# Patient Record
Sex: MALE | Race: BLACK OR AFRICAN AMERICAN | Employment: OTHER | URBAN - METROPOLITAN AREA
[De-identification: names, ages, dates, MRNs, and addresses within clinical notes are randomized per-mention and may not be internally consistent; named-entity substitution may affect disease eponyms.]

---

## 2018-03-08 ENCOUNTER — OFFICE VISIT (OUTPATIENT)
Dept: FAMILY MEDICINE CLINIC | Age: 63
End: 2018-03-08

## 2018-03-08 VITALS
BODY MASS INDEX: 25.84 KG/M2 | RESPIRATION RATE: 20 BRPM | TEMPERATURE: 98.6 F | WEIGHT: 195 LBS | HEART RATE: 62 BPM | OXYGEN SATURATION: 100 % | DIASTOLIC BLOOD PRESSURE: 88 MMHG | SYSTOLIC BLOOD PRESSURE: 171 MMHG | HEIGHT: 73 IN

## 2018-03-08 DIAGNOSIS — I10 ESSENTIAL HYPERTENSION: Primary | ICD-10-CM

## 2018-03-08 DIAGNOSIS — Z12.11 SCREENING FOR COLON CANCER: ICD-10-CM

## 2018-03-08 DIAGNOSIS — F17.200 SMOKER: ICD-10-CM

## 2018-03-08 RX ORDER — VARENICLINE TARTRATE 1 MG/1
TABLET, FILM COATED ORAL
Qty: 56 TAB | Refills: 0 | Status: SHIPPED | COMMUNITY
Start: 2018-03-08 | End: 2018-06-06

## 2018-03-08 RX ORDER — AMLODIPINE BESYLATE 10 MG/1
10 TABLET ORAL DAILY
Qty: 30 TAB | Refills: 2 | Status: SHIPPED | OUTPATIENT
Start: 2018-03-08

## 2018-03-08 NOTE — PROGRESS NOTES
No chief complaint on file. 1. When and where did you last receive medical care? [facility] 3/5/2018- Work physical    2. When and where did you last have preventive care such as mammogram, pap smears or colon screening? No history of colon screening    3. What is your current living situation (for example, live alone, live in home with immediate family members)? Lives a ex-wife    4. Do you have any problems with communication such trouble seeing, hearing, or understanding instructions? No    5. Do you have an advance directive? This is a document that you can give to family members with instructions for how you would want them to make health care decisions for you if you were unable to speak for yourself. (For example, unconscious, delerious)No    PMH/FH/Social Hx reviewed and updated as needed     Applicable screenings reviewed and updated as needed  Medication reconciliation performed. Patient does need medication refills. Health Maintenance reviewed.

## 2018-03-08 NOTE — PROGRESS NOTES
KIKE Larsoe is a 65 y.o. male being seen today for   Chief Complaint   Patient presents with    New Patient     [facility] healthcare services    Hypertension     Monitor and emdciate HTN   . he states that he was sent by kamilah made after job physical showed his bp was too high. Previously on amlodipine but not in awhile. States he is going to get insurance once he has job. Would like to quit smoking. Past Medical History:   Diagnosis Date    Hypertension 10/2008         ROS  Patient states that he is feeling well. Denies complaints of chest pain, shortness of breath, swelling of legs, dizziness or weakness. he denies nausea, vomiting or diarrhea. Current Outpatient Prescriptions   Medication Sig    amLODIPine (NORVASC) 10 mg tablet Take 1 Tab by mouth daily.  varenicline (CHANTIX) 1 mg tablet Take as directed     No current facility-administered medications for this visit. PE  Visit Vitals    /88 (BP 1 Location: Left arm, BP Patient Position: Sitting)    Pulse 62    Temp 98.6 °F (37 °C) (Oral)    Resp 20    Ht 6' 1\" (1.854 m)    Wt 195 lb (88.5 kg)    SpO2 100%    BMI 25.73 kg/m2        Alert and oriented with normal mood and affect. he is well developed and well nourished . Lungs are clear without wheezing. Heart rate is regular without murmurs or gallops. There is no lower extremity edema. No results found for this or any previous visit. Assessment and Plan:        ICD-10-CM ICD-9-CM    1. Essential hypertension I10 401.9    2. Smoker F17.200 305.1    3. Screening for colon cancer Z12.11 V76.51 OCCULT BLOOD, IMMUNOASSAY (FIT)   restart amlodipine  Follow up 2 mos here or with new pcp if he has insurance for bp check  Sample chantix.   Reviewed how to use and quit plan  Pap process initated for same    Carmen Woodall MD

## 2018-03-08 NOTE — PROGRESS NOTES
Discharge instructions reviewed with patient    Medication list and understanding of medications reviewed with patient. OTC and herbal medications reviewed and added to med list if applicable  Barriers to adherence assessed. Guidance given regarding new medications this visit, including reason for taking this medicine, and common side effects. Given sample Chantix and You have been given a Pharmacy Assistance Program application, also known as P. A. P. application. Please complete all pages and return your P. A. P. application with your financial documentation to any of our [address] [place] [address] sites. If you can not return to one of upcoming sites, you may either mail or fax it to Leo Carroll, the coordinator, yourself.  Her Fax number is [phone]. Her address is [address] [place], [place] [address]. IT IS BEST TO BRING IT BACK TO US TO HAVE IT CHECKED BEFORE SENDING. Once your application is received, it may take anywhere from 3 to 6 weeks for it to be approved.  THE FIRST FILL OF YOUR MEDICINE [facility]. IN RARE CASES IT WILL BE MAILED TO YOUR HOME. REMEMBER: CALL [phone] OR [phone]    TO ASK FOR REFILLS WHEN YOU HAVE ONE MONTH    OF MEDICINE LEFT.  Think of it the same way as when you call your regular pharmacy to order your medicine refills. For continuing Chantix. Also given Good Rx Card and  FIT Test collection instructions and kit.

## 2018-06-12 ENCOUNTER — HOSPITAL ENCOUNTER (EMERGENCY)
Age: 63
Discharge: HOME | End: 2018-06-13
Attending: EMERGENCY MEDICINE
Payer: SELF-PAY

## 2018-06-12 ENCOUNTER — APPOINTMENT (OUTPATIENT)
Dept: CT IMAGING | Age: 63
End: 2018-06-12
Attending: EMERGENCY MEDICINE
Payer: SELF-PAY

## 2018-06-12 ENCOUNTER — APPOINTMENT (OUTPATIENT)
Dept: GENERAL RADIOLOGY | Age: 63
End: 2018-06-12
Attending: EMERGENCY MEDICINE
Payer: SELF-PAY

## 2018-06-12 DIAGNOSIS — R10.84 ABDOMINAL PAIN, GENERALIZED: ICD-10-CM

## 2018-06-12 DIAGNOSIS — R45.851 SUICIDAL THOUGHTS: Primary | ICD-10-CM

## 2018-06-12 LAB
ALBUMIN SERPL-MCNC: 4.1 G/DL (ref 3.4–5)
ALBUMIN/GLOB SERPL: 0.7 {RATIO} (ref 0.8–1.7)
ALP SERPL-CCNC: 115 U/L (ref 45–117)
ALT SERPL-CCNC: 84 U/L (ref 16–61)
AMPHET UR QL SCN: NEGATIVE
ANION GAP SERPL CALC-SCNC: 10 MMOL/L (ref 3–18)
AST SERPL-CCNC: 106 U/L (ref 15–37)
ATRIAL RATE: 81 BPM
BARBITURATES UR QL SCN: NEGATIVE
BASOPHILS # BLD: 0 K/UL (ref 0–0.1)
BASOPHILS NFR BLD: 0 % (ref 0–2)
BENZODIAZ UR QL: NEGATIVE
BILIRUB SERPL-MCNC: 1.2 MG/DL (ref 0.2–1)
BNP SERPL-MCNC: 405 PG/ML (ref 0–900)
BUN SERPL-MCNC: 7 MG/DL (ref 7–18)
BUN/CREAT SERPL: 6 (ref 12–20)
CALCIUM SERPL-MCNC: 9.1 MG/DL (ref 8.5–10.1)
CALCULATED P AXIS, ECG09: 80 DEGREES
CALCULATED R AXIS, ECG10: 22 DEGREES
CALCULATED T AXIS, ECG11: 66 DEGREES
CANNABINOIDS UR QL SCN: NEGATIVE
CHLORIDE SERPL-SCNC: 100 MMOL/L (ref 100–108)
CO2 SERPL-SCNC: 29 MMOL/L (ref 21–32)
COCAINE UR QL SCN: NEGATIVE
CREAT SERPL-MCNC: 1.1 MG/DL (ref 0.6–1.3)
DIAGNOSIS, 93000: NORMAL
DIFFERENTIAL METHOD BLD: ABNORMAL
EOSINOPHIL # BLD: 0.1 K/UL (ref 0–0.4)
EOSINOPHIL NFR BLD: 1 % (ref 0–5)
ERYTHROCYTE [DISTWIDTH] IN BLOOD BY AUTOMATED COUNT: 13.9 % (ref 11.6–14.5)
ETHANOL SERPL-MCNC: 139 MG/DL (ref 0–3)
GLOBULIN SER CALC-MCNC: 5.8 G/DL (ref 2–4)
GLUCOSE SERPL-MCNC: 70 MG/DL (ref 74–99)
HCT VFR BLD AUTO: 45.9 % (ref 36–48)
HDSCOM,HDSCOM: NORMAL
HGB BLD-MCNC: 15.8 G/DL (ref 13–16)
LYMPHOCYTES # BLD: 1.4 K/UL (ref 0.9–3.6)
LYMPHOCYTES NFR BLD: 23 % (ref 21–52)
MCH RBC QN AUTO: 31.7 PG (ref 24–34)
MCHC RBC AUTO-ENTMCNC: 34.4 G/DL (ref 31–37)
MCV RBC AUTO: 92 FL (ref 74–97)
METHADONE UR QL: NEGATIVE
MONOCYTES # BLD: 0.7 K/UL (ref 0.05–1.2)
MONOCYTES NFR BLD: 11 % (ref 3–10)
NEUTS SEG # BLD: 4 K/UL (ref 1.8–8)
NEUTS SEG NFR BLD: 65 % (ref 40–73)
OPIATES UR QL: NEGATIVE
P-R INTERVAL, ECG05: 180 MS
PCP UR QL: NEGATIVE
PLATELET # BLD AUTO: 187 K/UL (ref 135–420)
PMV BLD AUTO: 10.7 FL (ref 9.2–11.8)
POTASSIUM SERPL-SCNC: 4 MMOL/L (ref 3.5–5.5)
PROT SERPL-MCNC: 9.9 G/DL (ref 6.4–8.2)
Q-T INTERVAL, ECG07: 400 MS
QRS DURATION, ECG06: 78 MS
QTC CALCULATION (BEZET), ECG08: 464 MS
RBC # BLD AUTO: 4.99 M/UL (ref 4.7–5.5)
SODIUM SERPL-SCNC: 139 MMOL/L (ref 136–145)
TROPONIN I SERPL-MCNC: <0.02 NG/ML (ref 0–0.04)
TROPONIN I SERPL-MCNC: <0.02 NG/ML (ref 0–0.04)
VENTRICULAR RATE, ECG03: 81 BPM
WBC # BLD AUTO: 6.2 K/UL (ref 4.6–13.2)

## 2018-06-12 PROCEDURE — 71046 X-RAY EXAM CHEST 2 VIEWS: CPT

## 2018-06-12 PROCEDURE — 84484 ASSAY OF TROPONIN QUANT: CPT | Performed by: EMERGENCY MEDICINE

## 2018-06-12 PROCEDURE — 80053 COMPREHEN METABOLIC PANEL: CPT | Performed by: EMERGENCY MEDICINE

## 2018-06-12 PROCEDURE — 85025 COMPLETE CBC W/AUTO DIFF WBC: CPT | Performed by: EMERGENCY MEDICINE

## 2018-06-12 PROCEDURE — 93005 ELECTROCARDIOGRAM TRACING: CPT

## 2018-06-12 PROCEDURE — 83880 ASSAY OF NATRIURETIC PEPTIDE: CPT | Performed by: EMERGENCY MEDICINE

## 2018-06-12 PROCEDURE — 80307 DRUG TEST PRSMV CHEM ANLYZR: CPT | Performed by: EMERGENCY MEDICINE

## 2018-06-12 PROCEDURE — 74011000258 HC RX REV CODE- 258: Performed by: EMERGENCY MEDICINE

## 2018-06-12 PROCEDURE — 70450 CT HEAD/BRAIN W/O DYE: CPT

## 2018-06-12 PROCEDURE — 96374 THER/PROPH/DIAG INJ IV PUSH: CPT

## 2018-06-12 PROCEDURE — 74011000250 HC RX REV CODE- 250: Performed by: EMERGENCY MEDICINE

## 2018-06-12 PROCEDURE — 74011250637 HC RX REV CODE- 250/637: Performed by: EMERGENCY MEDICINE

## 2018-06-12 PROCEDURE — 99285 EMERGENCY DEPT VISIT HI MDM: CPT

## 2018-06-12 RX ORDER — AMLODIPINE BESYLATE 10 MG/1
10 TABLET ORAL
Status: COMPLETED | OUTPATIENT
Start: 2018-06-12 | End: 2018-06-12

## 2018-06-12 RX ADMIN — SODIUM CHLORIDE 0.62 MG: 900 INJECTION, SOLUTION INTRAVENOUS at 21:02

## 2018-06-12 RX ADMIN — AMLODIPINE BESYLATE 10 MG: 10 TABLET ORAL at 23:46

## 2018-06-12 NOTE — ED NOTES
Received patient in room 9. Patient appears in no distress,. Awaiting completion of testing to be resulted then crisis evaluation. Reviewed poc with patient. Questions encouraged and answered.   Patient verbalized an understanding

## 2018-06-12 NOTE — ED PROVIDER NOTES
EMERGENCY DEPARTMENT HISTORY AND PHYSICAL EXAM    3:39 PM      Date: 6/12/2018  Patient Name: Sharon Chung    History of Presenting Illness     Chief Complaint   Patient presents with    Abdominal Pain         History Provided By: Patient    Chief Complaint: Elevated BP  Duration: 2-3 Weeks  Timing:  Constant  Severity: Moderate  Modifying Factors: Ran out of his Amlodipine 1 month ago  Associated Symptoms: dizziness, CP, SOB      Additional History (Context):     Sharon Chung is a 65 y.o. male with a pertinent history of HTN, presenting to the ED via EMS c/o elevated BP x 2-3 weeks. Also reports dizziness, CP, SOB. Pt denies HA. Reports he ran out of the Rx'ed Amlodipine 1 month ago. He has not been able to get the that Rx refilled due to financial restraints. Admits to EtOH use (\"a 6-pack a day\"). States he is currently unemployed. No other acute symptoms or complaints were noted. PCP: None    Current Facility-Administered Medications   Medication Dose Route Frequency Provider Last Rate Last Dose    enalaprilat (VASOTEC) 0.625 mg in 0.9% sodium chloride 50 mL IVPB  0.625 mg IntraVENous Q6H Isidro Waddell MD   0.625 mg at 06/12/18 2102     Current Outpatient Prescriptions   Medication Sig Dispense Refill    amLODIPine (NORVASC) 10 mg tablet Take 1 Tab by mouth daily. 30 Tab 2       Past History     Past Medical History:  Past Medical History:   Diagnosis Date    Hypertension 10/2008       Past Surgical History:  History reviewed. No pertinent surgical history. Family History:  Family History   Problem Relation Age of Onset    Hypertension Mother     Hypertension Father        Social History:  Social History   Substance Use Topics    Smoking status: Former Smoker     Quit date: 3/7/2018    Smokeless tobacco: Never Used    Alcohol use 8.4 oz/week     14 Cans of beer per week       Allergies:  No Known Allergies      Review of Systems       Review of Systems   Constitutional: Negative for fever. Eyes: Positive for visual disturbance. Respiratory: Positive for shortness of breath. Cardiovascular: Positive for chest pain. Skin: Negative for rash. Neurological: Positive for dizziness. Negative for headaches. All other systems reviewed and are negative. Physical Exam     Visit Vitals    BP (!) 189/96    Pulse 82    Resp 22    SpO2 96%         Physical Exam    Physical Exam:  . Patient Vitals for the past 12 hrs:   Pulse Resp BP SpO2   06/12/18 2200 82 22 (!) 189/96 96 %   06/12/18 2130 91 12 (!) 186/95 97 %   06/12/18 2115 99 27 (!) 196/94 98 %   06/12/18 2045 84 14 199/90 98 %   06/12/18 2030 86 18 (!) 187/100 99 %   06/12/18 2015 78 19 (!) 189/95 97 %   06/12/18 2000 80 17 (!) 183/97 98 %   06/12/18 1930 78 15 (!) 197/104 95 %   06/12/18 1900 89 17 198/88 100 %   06/12/18 1830 79 19 (!) 179/94 91 %   06/12/18 1800 83 10 (!) 183/94 99 %     Gen: Well developed, well nourished 65 y.o. male  HEENT: Normocephalic, atraumatic  Respiratory: No accessory muscle use No wheeze, No rales, No rhonchi. Normal chest wall excursion. No subcutaneous air, no rib crepitus  Cardiovascular: Regular rhythm and rate, Normal pulses, Normal perfusion. No edema  Gastrointestinal: Non distended, Non tender, No masses. No ascites. No organomegaly. No evidence of trauma  Musculoskeletal: Full range of motion at all other tested joints. No joint effusions. Neuro: Normal strength, Normal sensation. Normal speech. No ataxia. Cranial Nerves II-XII normal as tested. Skin: No rash, petechia or purpura. Warm and dry  Psyche: No suicidal ideation, No homicidal ideation. No hallucinations. Organized thoughts.   Heme: Normal  : Deferred        Diagnostic Study Results     Labs -  Recent Results (from the past 12 hour(s))   DRUG SCREEN, URINE    Collection Time: 06/12/18  4:15 PM   Result Value Ref Range    BENZODIAZEPINES NEGATIVE  NEG      BARBITURATES NEGATIVE  NEG      THC (TH-CANNABINOL) NEGATIVE  NEG      OPIATES NEGATIVE  NEG      PCP(PHENCYCLIDINE) NEGATIVE  NEG      COCAINE NEGATIVE  NEG      AMPHETAMINES NEGATIVE  NEG      METHADONE NEGATIVE  NEG      HDSCOM (NOTE)    EKG, 12 LEAD, INITIAL    Collection Time: 06/12/18  4:29 PM   Result Value Ref Range    Ventricular Rate 81 BPM    Atrial Rate 81 BPM    P-R Interval 180 ms    QRS Duration 78 ms    Q-T Interval 400 ms    QTC Calculation (Bezet) 464 ms    Calculated P Axis 80 degrees    Calculated R Axis 22 degrees    Calculated T Axis 66 degrees    Diagnosis       Normal sinus rhythm  Normal ECG  No previous ECGs available  Confirmed by Nenita Okeefe MD, Yee Chavez ([id number]) on 6/12/2018 4:57:49 PM     CBC WITH AUTOMATED DIFF    Collection Time: 06/12/18  4:45 PM   Result Value Ref Range    WBC 6.2 4.6 - 13.2 K/uL    RBC 4.99 4.70 - 5.50 M/uL    HGB 15.8 13.0 - 16.0 g/dL    HCT 45.9 36.0 - 48.0 %    MCV 92.0 74.0 - 97.0 FL    MCH 31.7 24.0 - 34.0 PG    MCHC 34.4 31.0 - 37.0 g/dL    RDW 13.9 11.6 - 14.5 %    PLATELET 584 239 - 224 K/uL    MPV 10.7 9.2 - 11.8 FL    NEUTROPHILS 65 40 - 73 %    LYMPHOCYTES 23 21 - 52 %    MONOCYTES 11 (H) 3 - 10 %    EOSINOPHILS 1 0 - 5 %    BASOPHILS 0 0 - 2 %    ABS. NEUTROPHILS 4.0 1.8 - 8.0 K/UL    ABS. LYMPHOCYTES 1.4 0.9 - 3.6 K/UL    ABS. MONOCYTES 0.7 0.05 - 1.2 K/UL    ABS. EOSINOPHILS 0.1 0.0 - 0.4 K/UL    ABS.  BASOPHILS 0.0 0.0 - 0.1 K/UL    DF AUTOMATED     METABOLIC PANEL, COMPREHENSIVE    Collection Time: 06/12/18  4:45 PM   Result Value Ref Range    Sodium 139 136 - 145 mmol/L    Potassium 4.0 3.5 - 5.5 mmol/L    Chloride 100 100 - 108 mmol/L    CO2 29 21 - 32 mmol/L    Anion gap 10 3.0 - 18 mmol/L    Glucose 70 (L) 74 - 99 mg/dL    BUN 7 7.0 - 18 MG/DL    Creatinine 1.10 0.6 - 1.3 MG/DL    BUN/Creatinine ratio 6 (L) 12 - 20      GFR est AA >60 >60 ml/min/1.73m2    GFR est non-AA >60 >60 ml/min/1.73m2    Calcium 9.1 8.5 - 10.1 MG/DL    Bilirubin, total 1.2 (H) 0.2 - 1.0 MG/DL    ALT (SGPT) 84 (H) 16 - 61 U/L    AST (SGOT) 106 (H) 15 - 37 U/L Alk. phosphatase 115 45 - 117 U/L    Protein, total 9.9 (H) 6.4 - 8.2 g/dL    Albumin 4.1 3.4 - 5.0 g/dL    Globulin 5.8 (H) 2.0 - 4.0 g/dL    A-G Ratio 0.7 (L) 0.8 - 1.7     TROPONIN I    Collection Time: 06/12/18  4:45 PM   Result Value Ref Range    Troponin-I, Qt. <0.02 0.0 - 0.045 NG/ML   NT-PRO BNP    Collection Time: 06/12/18  4:45 PM   Result Value Ref Range    NT pro- 0 - 900 PG/ML   ETHYL ALCOHOL    Collection Time: 06/12/18  4:45 PM   Result Value Ref Range    ALCOHOL(ETHYL),SERUM 139 (H) 0 - 3 MG/DL   TROPONIN I    Collection Time: 06/12/18  6:45 PM   Result Value Ref Range    Troponin-I, Qt. <0.02 0.0 - 0.045 NG/ML       Radiologic Studies -   CT HEAD WO CONT   Final Result  IMPRESSION:  No radiographic finding for an acute intracranial process.     Mild periventricular white matter hypodensities which are nonspecific. May  represent chronic microvascular white matter change. As read by the radiologist.      XR CHEST PA LAT   Final Result  IMPRESSION:   No acute cardiopulmonary disease. As read by the radiologist.       Medical Decision Making   I am the first provider for this patient. I reviewed the vital signs, available nursing notes, past medical history, past surgical history, family history and social history. Vital Signs-Reviewed the patient's vital signs. Pulse Oximetry Analysis -  95% on room air (Interpretation)    Cardiac Monitor:  Rate: 89  Rhythm:  Normal Sinus Rhythm     EKG: Interpreted by the EP. Time Interpreted: 16:29   Rate: 81    Rhythm: Normal Sinus Rhythm    Interpretation: QRS duration 78. No STEMI    Records Reviewed: Nursing Notes and Old Medical Records (Time of Review: 3:39 PM)    ED Course: Progress Notes, Reevaluation, and Consults:      Provider Notes (Medical Decision Making):     7:10 PM Pt states he has had SI for months. Reports he broke up with his ex and has had worsening SI since then. Will call Crisis once I get a 2nd troponin.      7:52 PM Consult: I discussed care with Crisis. It was a standard discussion including patient history, chief complaint, available diagnostic results, and predicted treatment course. Will come and evaluate the pt. Consult: Margaret Jaquez (Crisis) evaluated the pt. States she contacted [facility] for possible crisis stabilization. 11:00 PM : Pt care transferred to Dr. Maco Rushing, ED provider. History of patient complaint(s), available diagnostic reports and current treatment plan has been discussed thoroughly. Bedside rounding on patient occured : yes . Intended disposition of patient : TBD  Pending diagnostics reports and/or labs (please list): [facility] evaluation     Diagnosis     Clinical Impression:   1. Suicidal thoughts    2. Abdominal pain, generalized          Disposition: TBD    Patient's Medications   Start Taking    No medications on file   Continue Taking    AMLODIPINE (NORVASC) 10 MG TABLET    Take 1 Tab by mouth daily. These Medications have changed    No medications on file   Stop Taking    No medications on file     _______________________________    Attestations:  Scribe Attestation     Khris Bell acting as a scribe for and in the presence of Dona Stanford MD     June 12, 2018 at 3:39 PM       Provider Attestation:      I personally performed the services described in the documentation, reviewed the documentation, as recorded by the scribe in my presence, and it accurately and completely records my words and actions.  June 12, 2018 at 3:39 PM - Jen Waddell MD    _______________________________

## 2018-06-13 VITALS
SYSTOLIC BLOOD PRESSURE: 167 MMHG | RESPIRATION RATE: 20 BRPM | HEART RATE: 91 BPM | OXYGEN SATURATION: 84 % | DIASTOLIC BLOOD PRESSURE: 92 MMHG

## 2018-06-13 PROCEDURE — 74011250637 HC RX REV CODE- 250/637: Performed by: EMERGENCY MEDICINE

## 2018-06-13 RX ORDER — AMLODIPINE BESYLATE 10 MG/1
10 TABLET ORAL
Status: COMPLETED | OUTPATIENT
Start: 2018-06-13 | End: 2018-06-13

## 2018-06-13 RX ADMIN — AMLODIPINE BESYLATE 10 MG: 10 TABLET ORAL at 07:10

## 2018-06-13 NOTE — ED NOTES
Pt reports that he cant go home due to him feeling suicidal and depressed. Pt denies HI. MD made aware of pt concerns.

## 2018-06-13 NOTE — ED NOTES
11:00 PM :Pt care assumed from Dr. Daron Ward , ED provider. Pt complaint(s), current treatment plan, progression and available diagnostic results have been discussed thoroughly. Rounding occurred: yes  Intended Disposition: TBD   Pending diagnostic reports and/or labs (please list): Crisis eval    [facility]risis has seen patient and will do bed search for admission for SI. Patient stable. Hypertensive chronically, poorly compliant with amlodipine. Will give his amlodipine but would not aim to aggressively lower his blood pressure acutely. Scribe Attestation     Harjit Deutsch acting as a scribe for and in the presence of Tami Mayo MD      June 13, 2018 at 11:00 PM       Provider Attestation:      I personally performed the services described in the documentation, reviewed the documentation, as recorded by the scribe in my presence, and it accurately and completely records my words and actions.  June 13, 2018 at 11:00 PM - Tami Mayo MD

## 2018-06-13 NOTE — ED NOTES
7:21 AM :Pt care assumed from Dr. Valeria Hinds, ED provider. Pt complaint(s), current treatment plan, progression and available diagnostic results have been discussed thoroughly. Rounding occurred: yes  Intended Disposition: Admit   Pending diagnostic reports and/or labs (please list): pt is awaiting bed search    12:28 PM Progress note: [facility] is still looking for placement. 1:12 PM Progress note:  The pt is to be admitted to [facility] stabilization unit at 3 PM under the care of Graciela Gray

## 2018-06-13 NOTE — ED NOTES
Patient resting comfortably in bed, wearing paper scrubs with belongings at bedside. Is in NAD, denies complaint.

## 2018-06-13 NOTE — ROUTINE PROCESS
Attempted multiple times to call report. Was placed on hold transferred to non-working numbers and hung on 3x's.

## 2018-06-13 NOTE — BSMART NOTE
Per Jaxson Salcido of [facility] patient has been accepted to the [facility].     Admission is for today June 13,2018 @ 1500    Accepting is Dr. Carlos Ervin    Report # [id number]

## 2018-06-13 NOTE — ROUTINE PROCESS
IV removed, VS obtained. Patient received and reviewed discharge instructions and follow up plan. Nad at discharge by ambulation with EMS transport to Crisis Stabilization. Patient ID band not removed prior to discharge.
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
## 2019-04-10 ENCOUNTER — HOSPITAL ENCOUNTER (OUTPATIENT)
Dept: LAB | Age: 64
Discharge: HOME | End: 2019-04-10
Payer: COMMERCIAL

## 2019-04-10 PROCEDURE — 80353 DRUG SCREENING COCAINE: CPT

## 2019-04-10 PROCEDURE — 82542 COL CHROMOTOGRAPHY QUAL/QUAN: CPT

## 2019-04-10 PROCEDURE — 36415 COLL VENOUS BLD VENIPUNCTURE: CPT

## 2019-04-10 PROCEDURE — 80307 DRUG TEST PRSMV CHEM ANLYZR: CPT

## 2019-04-16 LAB
AMPHETAMINES UR QL SCN: NEGATIVE NG/ML
BARBITURATES UR QL SCN: NEGATIVE NG/ML
BENZODIAZ UR QL: NEGATIVE NG/ML
BZE UR CFM-MCNC: ABNORMAL NG/ML
BZE UR QL: NORMAL NG/ML
BZE UR QL: POSITIVE
CANNABINOID, 777409: POSITIVE
CANNABINOIDS UR QL SCN: NORMAL NG/ML
CARBOXY THC GC/MS CONF., 777417: 51 NG/ML
ETHANOL UR-MCNC: NEGATIVE %
OPIATES UR QL: NEGATIVE NG/ML
PCP UR QL: NEGATIVE NG/ML